# Patient Record
Sex: FEMALE | Race: WHITE | NOT HISPANIC OR LATINO | URBAN - METROPOLITAN AREA
[De-identification: names, ages, dates, MRNs, and addresses within clinical notes are randomized per-mention and may not be internally consistent; named-entity substitution may affect disease eponyms.]

---

## 2022-11-30 ENCOUNTER — TRANSFERRED RECORDS (OUTPATIENT)
Dept: HEALTH INFORMATION MANAGEMENT | Facility: CLINIC | Age: 57
End: 2022-11-30

## 2023-01-02 ENCOUNTER — MEDICAL CORRESPONDENCE (OUTPATIENT)
Dept: HEALTH INFORMATION MANAGEMENT | Facility: CLINIC | Age: 58
End: 2023-01-02

## 2023-01-03 ENCOUNTER — TRANSCRIBE ORDERS (OUTPATIENT)
Dept: OTHER | Age: 58
End: 2023-01-03

## 2023-01-03 DIAGNOSIS — G35 MULTIPLE SCLEROSIS (H): Primary | ICD-10-CM

## 2023-01-04 NOTE — TELEPHONE ENCOUNTER
Action 1/4/23 MV 1.54pm   Action Taken Imaging request faxed to [facility]    1/18/23 MV 2.28pm  Images resolved in PACS         RECORDS RECEIVED FROM: external   REASON FOR VISIT: MS   Date of Appt: 1/25/23   NOTES (FOR ALL VISITS) STATUS DETAILS   OFFICE NOTE from referring provider Care Everywhere Dr Raheel Kidd @ [facility]:  9/20/22  9/8/22  3/3/22  2/7/22  8/16/21  6/25/21   OFFICE NOTE from other specialist Care Everywhere Yary ALMARAZ @ [facility]:  11/10/22  10/6/22  8/8/22  7/6/22  (additional encounters)   DISCHARGE SUMMARY from hospital Care Everywhere [facility]:  5/2/21-5/6/21   MEDICATION LIST Care Everywhere    IMAGING  (FOR ALL VISITS)     MRI (HEAD, NECK, SPINE) Received [facility]:  MRI Head 7/19/21  MRI Cervical Spine 7/19/21  MRI Thoracic Spine 7/19/21  MRI Head 1/21/19  MRI Cervical Spine 1/21/19  MRI Thoracic Spine 1/21/19

## 2023-01-25 ENCOUNTER — OFFICE VISIT (OUTPATIENT)
Dept: NEUROLOGY | Facility: CLINIC | Age: 58
End: 2023-01-25
Attending: PSYCHIATRY & NEUROLOGY
Payer: MEDICAID

## 2023-01-25 ENCOUNTER — PRE VISIT (OUTPATIENT)
Dept: NEUROLOGY | Facility: CLINIC | Age: 58
End: 2023-01-25
Payer: MEDICAID

## 2023-01-25 VITALS — DIASTOLIC BLOOD PRESSURE: 74 MMHG | HEART RATE: 88 BPM | OXYGEN SATURATION: 93 % | SYSTOLIC BLOOD PRESSURE: 106 MMHG

## 2023-01-25 DIAGNOSIS — G81.91 RIGHT HEMIPLEGIA (H): ICD-10-CM

## 2023-01-25 DIAGNOSIS — G82.50 QUADRIPARESIS (H): ICD-10-CM

## 2023-01-25 DIAGNOSIS — G35 MULTIPLE SCLEROSIS (H): Primary | ICD-10-CM

## 2023-01-25 PROCEDURE — 99204 OFFICE O/P NEW MOD 45 MIN: CPT | Mod: GC | Performed by: PSYCHIATRY & NEUROLOGY

## 2023-01-25 PROCEDURE — G0463 HOSPITAL OUTPT CLINIC VISIT: HCPCS | Performed by: PSYCHIATRY & NEUROLOGY

## 2023-01-25 PROCEDURE — G0463 HOSPITAL OUTPT CLINIC VISIT: HCPCS

## 2023-01-25 RX ORDER — RIZATRIPTAN BENZOATE 10 MG/1
10 TABLET, ORALLY DISINTEGRATING ORAL
COMMUNITY
Start: 2022-05-10

## 2023-01-25 RX ORDER — CITALOPRAM HYDROBROMIDE 20 MG/1
40 TABLET ORAL
COMMUNITY
Start: 2022-09-29

## 2023-01-25 RX ORDER — MIRABEGRON 50 MG/1
50 TABLET, EXTENDED RELEASE ORAL
COMMUNITY
Start: 2022-08-16

## 2023-01-25 RX ORDER — LEFLUNOMIDE 20 MG/1
20 TABLET ORAL
COMMUNITY
Start: 2022-11-30

## 2023-01-25 RX ORDER — BUDESONIDE 3 MG/1
9 CAPSULE, COATED PELLETS ORAL
COMMUNITY
Start: 2022-11-02

## 2023-01-25 RX ORDER — MULTIVITAMIN
1 TABLET ORAL DAILY
COMMUNITY
Start: 2022-05-10

## 2023-01-25 RX ORDER — HYDROCODONE BITARTRATE AND ACETAMINOPHEN 5; 325 MG/1; MG/1
1 TABLET ORAL
COMMUNITY
Start: 2022-12-27

## 2023-01-25 RX ORDER — CEPHALEXIN 500 MG/1
CAPSULE ORAL
COMMUNITY
Start: 2022-12-06

## 2023-01-25 RX ORDER — ONDANSETRON 4 MG/1
TABLET, ORALLY DISINTEGRATING ORAL
COMMUNITY
Start: 2022-06-30

## 2023-01-25 ASSESSMENT — PAIN SCALES - GENERAL: PAINLEVEL: NO PAIN (0)

## 2023-01-25 NOTE — PROGRESS NOTES
Neurology Clinic Visit    Reason: Multiple Sclerosis       01/25/2023   Source of information: Patient and chart review and her  who accompanies her today.     History of Present Symptom:  Yeny García is a 57 year old female with a PMH significant for rheumatoid arthritis (on leflunomide), who presents today for second opinion regarding DMT options.     She was recently diagnosed with microscopic colitis based on colonoscopy June 2022. This was after she developed diarrhea 1 month after starting Ocrevus. She was treated with budesonide 9 mg daily and symptoms improved. She has seen Destiny Alberts from gastroenterology for this. She has been off of Budesonide since Sunday, previously tried to go off in December but had return of symptoms.     MS history:  She states [id number] had abnormality on MRI for migraine work up led to additional CSF testing which led to diagnosis around the year 2000. She states a short time after she had right sided weakness abrupt onset upon waking. Treated with IV steroids with good response. Interestingly she states she had optic neuritis at age 18 but no diagnosis was made at that time.     She describes gradual worsening of right sided weakness with also some weakness of the left hand. She states ~ 10 years ago she had slight dragging of the right leg. 5 years ago she had some limitation of her walking and used an AFO. Then she progressed to using a walker and a power wheelchair about 3 years ago. She started needing the wheelchair much more frequently after a COVID infection in June 2021. She can walk about 40 feet with a walker and support. From chart review it seems she was switched to Ocrevus summer 2021 for worsening leg weakness. There were no additional/enhancing lesions on MRI at that time.     She has significant neurogenic bladder and gets Botox injection for bladder spasms and mirbetriq 50 mg daily.     Most recent relapse: unknown  Previous disease modifying  therapy:   Copaxone 3 years (allergic type reaction)   Rebif through 2008, then less consistently and came off on her own for a while   2015 Tecfidera (side effects nausea, vomiting, dizziness)  Restarted rebif for a while   June 2021 Ocrevus     Enjoys her job as a      The patient's medical, surgical, social, and family history were personally reviewed with the patient.  No past medical history on file.   No past surgical history on file.     No family history on file.  No current outpatient medications on file.     No current facility-administered medications for this visit.     Not on File    Review of Systems:  14-point review of systems was completed. The pertinent positives and negatives are in the HPI.    Physical Examination   Vitals: There were no vitals taken for this visit.  General: Patient appears comfortable in no acute distress.   HEENT: NC/AT, no icterus, moist mucous membranes  Chest: non-labored on RA  Extremities: Warm, no edema  Skin: No rash or lesion   Psych: Affect appropriate for situation   Neuro:  Mental status: Awake, alert, attentive. Language is fluent with intact comprehension of commands.  Cranial nerves: PERRL with no relative afferent pupillary defect, conjugate gaze, EOMI, face symmetric, shoulder shrug strong, tongue protrusion/uvula midline, no dysarthria.   Motor:     R L  Deltoid  4 5  Biceps  4 5  Triceps 3+ 4  Wrist ext 3 5  Finger ext 1 4  Finger abd 1 4    Hip flexion 3 4+  Knee flexion 4 5  Knee ext 4- 5  Dorsiflexion 3 5    Reflexes: briskreflexes symmetric in biceps, brachioradialis, 3+ patellae, crossed on the right.  Sensory: Intact to light touch.   Coordination: FNF without ataxia or dysmetria on the left, limited by strength on the right.    Gait: Deferred (requires walker)     Laboratory:  Vit D 36   CD19 abs 0 (7/13/22)     HBV s ag negative HBV core ab negative     Imaging:    MRI 7/19/21   IMPRESSION:   Brain MRI:   1.  Stable scattered foci of signal  abnormality within the cerebral hemispheric white matter matter and corpus callosum compatible the patient's reported history of primary demyelinating disease from multiple sclerosis.  No evidence of abnormal brain parenchymal or leptomeningeal enhancement to suggest active demyelination or inflammatory disease.   2.  No evidence of acute intracranial hemorrhage, mass effect, or infarction.   3.  Mild brain parenchymal volume loss.     Cervical Spine MRI:   1.  Stable vague foci of signal abnormality within the cervical spinal cord at the C2 and C3 levels, C5 levels compatible with the patient's reported history of primary demyelinating disease from multiple sclerosis. No abnormal contrast enhancement within the cervical spinal cord to suggest active demyelination or inflammatory disease. No new foci of signal abnormality within the cervical spinal cord.   2.  Degenerative cervical spondylosis with level by level analysis as described above.     Thoracic Spine MRI:   1.  No evidence of signal abnormality, expansion, or enhancement within the thoracic spinal cord.   2.  No significant posterior disc bulge, spinal canal, or neural foraminal narrowing at any level within the thoracic spine.      Assessment/Plan:  Yeny García is a 57 year old female who presents for consultation regarding DMT. 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 She is on Ocrevus but has developed microscopic colitis.  The timing seems consistent with Ocrevus per patient report.  She is also on leflunomide which can sometimes be associated with microscopic colitis however she has been on this medication for many years prior.    We discussed that for secondary progressive MS the other agents would be more broadly immune suppressing and have a higher risk for side effects.  Additionally we would not recommend agents like cladribine or Lemtrada without ongoing relapses or active inflammation on MRI.  Her most recent  symptoms are primarily associated with COVID infection and deconditioning as well as progression.    B-cell therapy is ideal medication for the progressive component of disease however benefits are modest.  We proposed to work with physical therapy to work on deconditioning and maintenance of the nervous system function in the setting of MS and recent COVID infection.  Would like to see how she does over the course of 6 months and repeat imaging at that time.  If she remains stable from a physical mobility standpoint and an imaging standpoint it would be reasonable to see how she does without additional DMT.  She is 57 which is age range for inflammatory events tend to become less common and she did not have new lesions on her recent MRI.  She is also on leflunomide for RA which may provide some benefit as well.    If she were adamant to try a different medication and she were to have ongoing progression of weakness despite physical activity she could consider kesimpta.  This acts similarly to Ocrevus however may be she will respond better to this agent.  There are no known cases of colitis with Kesimpta to my knowledge however there is theoretical risk.    She plans to follow-up with Dr. Kidd who can assess her make final recommendations.     Patient seen and discussed with Dr. Reza.  I have reviewed the plan with the patient, who is in agreement.      Kerline Baumann, DO  Multiple Sclerosis Fellow

## 2023-01-25 NOTE — NURSING NOTE
Chief Complaint   Patient presents with     MS     New Patient     Multiple Sclerosis/Demeylination      Vitals were taken and medications were reconciled.   Ezekiel Dickerson, EMT  10:51 AM

## 2023-01-25 NOTE — PATIENT INSTRUCTIONS
Leflunomide can be sufficient to prevent MS inflammation     I think that your recent decline was related to your covid illness and deconditioning     I recommend you work with physical therapy to see if you can get a bit stronger now     Repeat MRI in 6 months     See Dr. Kidd after that     If you are weaker despite working with physical therapy OR there are changes on your MRI, then I would recommend you consider kesimpta (injection once per month)

## 2023-04-23 ENCOUNTER — HEALTH MAINTENANCE LETTER (OUTPATIENT)
Age: 58
End: 2023-04-23

## 2024-06-30 ENCOUNTER — HEALTH MAINTENANCE LETTER (OUTPATIENT)
Age: 59
End: 2024-06-30

## 2025-05-11 ENCOUNTER — HEALTH MAINTENANCE LETTER (OUTPATIENT)
Age: 60
End: 2025-05-11

## 2025-05-15 ENCOUNTER — LAB (OUTPATIENT)
Dept: LAB | Facility: CLINIC | Age: 60
End: 2025-05-15
Payer: COMMERCIAL

## 2025-05-15 DIAGNOSIS — G35 MULTIPLE SCLEROSIS (H): Primary | ICD-10-CM

## 2025-05-15 DIAGNOSIS — E55.9 AVITAMINOSIS D: ICD-10-CM

## 2025-05-15 LAB
VIT B12 SERPL-MCNC: 309 PG/ML (ref 232–1245)
VIT D+METAB SERPL-MCNC: 55 NG/ML (ref 20–50)

## 2025-05-15 PROCEDURE — 82607 VITAMIN B-12: CPT

## 2025-05-15 PROCEDURE — 82306 VITAMIN D 25 HYDROXY: CPT

## 2025-05-15 PROCEDURE — 36415 COLL VENOUS BLD VENIPUNCTURE: CPT

## 2025-06-01 ENCOUNTER — HEALTH MAINTENANCE LETTER (OUTPATIENT)
Age: 60
End: 2025-06-01